# Patient Record
Sex: FEMALE | Race: WHITE | Employment: FULL TIME | URBAN - METROPOLITAN AREA
[De-identification: names, ages, dates, MRNs, and addresses within clinical notes are randomized per-mention and may not be internally consistent; named-entity substitution may affect disease eponyms.]

---

## 2018-03-24 ENCOUNTER — HOSPITAL ENCOUNTER (OUTPATIENT)
Age: 53
Discharge: HOME | End: 2018-03-24
Attending: FAMILY MEDICINE
Payer: COMMERCIAL

## 2018-03-24 VITALS
RESPIRATION RATE: 20 BRPM | SYSTOLIC BLOOD PRESSURE: 141 MMHG | DIASTOLIC BLOOD PRESSURE: 83 MMHG | HEIGHT: 63 IN | OXYGEN SATURATION: 100 % | WEIGHT: 130 LBS | HEART RATE: 74 BPM | BODY MASS INDEX: 23.04 KG/M2 | TEMPERATURE: 98 F

## 2018-03-24 DIAGNOSIS — J01.00 ACUTE NON-RECURRENT MAXILLARY SINUSITIS: Primary | ICD-10-CM

## 2018-03-24 PROCEDURE — 99204 OFFICE O/P NEW MOD 45 MIN: CPT

## 2018-03-24 PROCEDURE — 99203 OFFICE O/P NEW LOW 30 MIN: CPT

## 2018-03-24 RX ORDER — AMOXICILLIN AND CLAVULANATE POTASSIUM 875; 125 MG/1; MG/1
1 TABLET, FILM COATED ORAL 2 TIMES DAILY
Qty: 14 TABLET | Refills: 0 | Status: SHIPPED | OUTPATIENT
Start: 2018-03-24 | End: 2018-03-31

## 2018-03-24 NOTE — ED PROVIDER NOTES
Patient Seen in: [address]    History   Patient presents with:  Cough/URI    Stated Complaint: sinus infection; bad cough    HPI      HISTORY OF PRESENT ILLNESS:Patient complains of URI symptoms for 8 days.   Complains o adenopathy, no swelling, supple no meningeal signs  Resp: lungs clear bilateral, no resp distress  Cadio: rr, no murmurs    DIFFERENTIAL DIAGNOSIS: After history and physical exam differential diagnosis was considered for sinusitis, infectious vs. allergic

## 2018-03-24 NOTE — ED NOTES
Pt verbalized understanding of DC instructions. Will f/u as instructed. Pt leaving IC stable.  Answered pt's questions

## 2018-03-24 NOTE — ED INITIAL ASSESSMENT (HPI)
Productive cough, right earache, sinus pressure, dizziness. Symptoms x 7-8 days.  Post nasal drip, pain in face and teeth

## 2018-05-07 ENCOUNTER — TELEPHONE (OUTPATIENT)
Dept: FAMILY MEDICINE CLINIC | Facility: CLINIC | Age: 53
End: 2018-05-07

## 2018-05-07 ENCOUNTER — OFFICE VISIT (OUTPATIENT)
Dept: FAMILY MEDICINE CLINIC | Facility: CLINIC | Age: 53
End: 2018-05-07

## 2018-05-07 VITALS
BODY MASS INDEX: 23.45 KG/M2 | HEART RATE: 67 BPM | WEIGHT: 134 LBS | TEMPERATURE: 98 F | HEIGHT: 63.5 IN | DIASTOLIC BLOOD PRESSURE: 86 MMHG | SYSTOLIC BLOOD PRESSURE: 131 MMHG | RESPIRATION RATE: 16 BRPM

## 2018-05-07 DIAGNOSIS — M25.562 ACUTE PAIN OF LEFT KNEE: ICD-10-CM

## 2018-05-07 DIAGNOSIS — Z12.39 SCREENING FOR BREAST CANCER: ICD-10-CM

## 2018-05-07 DIAGNOSIS — Z00.00 ROUTINE PHYSICAL EXAMINATION: Primary | ICD-10-CM

## 2018-05-07 DIAGNOSIS — Z01.419 ENCOUNTER FOR GYNECOLOGICAL EXAMINATION WITHOUT ABNORMAL FINDING: ICD-10-CM

## 2018-05-07 PROCEDURE — 93000 ELECTROCARDIOGRAM COMPLETE: CPT | Performed by: FAMILY MEDICINE

## 2018-05-07 PROCEDURE — 99386 PREV VISIT NEW AGE 40-64: CPT | Performed by: FAMILY MEDICINE

## 2018-05-07 NOTE — PROGRESS NOTES
HPI:  45 yr old female who presents new to clinic for physical. Has not had a doctor in 2.5 years. Sees chiro. Overall, healthy. Has 2 children- ages 22 and 23. Single. Works in compliance in regulatory affairs. Training to run the marathon.  Has run half interaction and psychiatric symptoms    PE:  /86   Pulse 67   Temp 98.4 °F (36.9 °C) (Oral)   Resp 16   Ht 5' 3.5\" (1.613 m)   Wt 134 lb (60.8 kg)   BMI 23.36 kg/m²   Gen:  Well-nourished. No distress. HEENT: PERRLA, conjunctive clear.   Shawn ear ca

## 2018-05-07 NOTE — TELEPHONE ENCOUNTER
Pt returned Ciara's call.   Pt will be going to [facility] in [place], [place]  Ph: [phone]  Fax: [phone]  Changed reason to ORDER

## 2018-05-07 NOTE — TELEPHONE ENCOUNTER
LMTCB. Please ask pt which location of [facility] she will be going to. Will fax the order once info received.

## 2018-05-14 ENCOUNTER — HOSPITAL ENCOUNTER (OUTPATIENT)
Dept: MAMMOGRAPHY | Age: 53
Discharge: HOME | End: 2018-05-14
Attending: FAMILY MEDICINE
Payer: COMMERCIAL

## 2018-05-14 DIAGNOSIS — Z12.39 SCREENING FOR BREAST CANCER: ICD-10-CM

## 2018-05-14 DIAGNOSIS — R92.2 DENSE BREASTS: Primary | ICD-10-CM

## 2018-05-14 PROCEDURE — 77063 BREAST TOMOSYNTHESIS BI: CPT | Performed by: FAMILY MEDICINE

## 2018-05-14 PROCEDURE — 77067 SCR MAMMO BI INCL CAD: CPT | Performed by: FAMILY MEDICINE

## 2020-11-05 DIAGNOSIS — Z12.31 ENCOUNTER FOR SCREENING MAMMOGRAM FOR BREAST CANCER: Primary | ICD-10-CM
